# Patient Record
Sex: MALE | URBAN - METROPOLITAN AREA
[De-identification: names, ages, dates, MRNs, and addresses within clinical notes are randomized per-mention and may not be internally consistent; named-entity substitution may affect disease eponyms.]

---

## 2021-09-08 ENCOUNTER — APPOINTMENT (RX ONLY)
Dept: URBAN - METROPOLITAN AREA CLINIC 134 | Facility: CLINIC | Age: 31
Setting detail: DERMATOLOGY
End: 2021-09-08

## 2021-09-08 DIAGNOSIS — L71.8 OTHER ROSACEA: ICD-10-CM

## 2021-09-08 PROCEDURE — ? COUNSELING

## 2021-09-08 PROCEDURE — 99203 OFFICE O/P NEW LOW 30 MIN: CPT

## 2021-09-08 PROCEDURE — ? TREATMENT REGIMEN

## 2021-09-08 PROCEDURE — ? PRESCRIPTION

## 2021-09-08 RX ORDER — DOXYCYCLINE 40 MG/1
CAPSULE ORAL
Qty: 30 | Refills: 6 | Status: ERX | COMMUNITY
Start: 2021-09-08

## 2021-09-08 RX ADMIN — DOXYCYCLINE: 40 CAPSULE ORAL at 00:00

## 2021-09-08 ASSESSMENT — LOCATION SIMPLE DESCRIPTION DERM
LOCATION SIMPLE: LEFT CHEEK
LOCATION SIMPLE: RIGHT CHEEK

## 2021-09-08 ASSESSMENT — LOCATION DETAILED DESCRIPTION DERM
LOCATION DETAILED: RIGHT MEDIAL MALAR CHEEK
LOCATION DETAILED: LEFT CENTRAL MALAR CHEEK

## 2021-09-08 ASSESSMENT — LOCATION ZONE DERM: LOCATION ZONE: FACE

## 2021-09-08 NOTE — PROCEDURE: TREATMENT REGIMEN
Samples Given: 4 40mg Oracea capsules.
Continue Regimen: Z-bar
Initiate Treatment: Oracea oral antibiotic QD. CAD rosacea gel QD
Detail Level: Zone
Plan: Patient has failed both finasia and Z-bar. Will prescribe Oracea today. Discussed potentially starting Accutane however patient prefers less aggressive options at this time. Will start RetinA after inflammation has reduced from using CAD Rosacea gel. Will try Topical soolantra if Oracea fails.